# Patient Record
Sex: MALE | Race: WHITE | NOT HISPANIC OR LATINO | Employment: UNEMPLOYED | URBAN - METROPOLITAN AREA
[De-identification: names, ages, dates, MRNs, and addresses within clinical notes are randomized per-mention and may not be internally consistent; named-entity substitution may affect disease eponyms.]

---

## 2022-06-14 ENCOUNTER — HOSPITAL ENCOUNTER (EMERGENCY)
Facility: HOSPITAL | Age: 47
Discharge: HOME | End: 2022-06-14
Attending: FAMILY MEDICINE
Payer: MEDICAID

## 2022-06-14 VITALS
SYSTOLIC BLOOD PRESSURE: 131 MMHG | BODY MASS INDEX: 25.9 KG/M2 | DIASTOLIC BLOOD PRESSURE: 81 MMHG | OXYGEN SATURATION: 98 % | TEMPERATURE: 98 F | RESPIRATION RATE: 18 BRPM | WEIGHT: 185 LBS | HEIGHT: 71 IN | HEART RATE: 78 BPM

## 2022-06-14 DIAGNOSIS — T15.91XA FOREIGN BODY, EYE, RIGHT, INITIAL ENCOUNTER: Primary | ICD-10-CM

## 2022-06-14 PROCEDURE — 65210 REMOVE FOREIGN BODY FROM EYE: CPT | Mod: RT

## 2022-06-14 PROCEDURE — 25000003 PHARM REV CODE 250: Performed by: FAMILY MEDICINE

## 2022-06-14 PROCEDURE — 99284 EMERGENCY DEPT VISIT MOD MDM: CPT | Mod: 25

## 2022-06-14 RX ORDER — GENTAMICIN SULFATE 3 MG/ML
2 SOLUTION/ DROPS OPHTHALMIC 4 TIMES DAILY
Qty: 5 ML | Refills: 0 | Status: SHIPPED | OUTPATIENT
Start: 2022-06-14 | End: 2022-06-14 | Stop reason: SDUPTHER

## 2022-06-14 RX ORDER — GENTAMICIN SULFATE 3 MG/ML
2 SOLUTION/ DROPS OPHTHALMIC 4 TIMES DAILY
Qty: 5 ML | Refills: 0 | Status: SHIPPED | OUTPATIENT
Start: 2022-06-14 | End: 2022-06-23

## 2022-06-14 RX ORDER — KETOROLAC TROMETHAMINE 5 MG/ML
1 SOLUTION OPHTHALMIC EVERY 6 HOURS
Qty: 10 ML | Refills: 0 | Status: SHIPPED | OUTPATIENT
Start: 2022-06-14 | End: 2022-06-21

## 2022-06-14 RX ORDER — TETRACAINE HYDROCHLORIDE 5 MG/ML
2 SOLUTION OPHTHALMIC
Status: COMPLETED | OUTPATIENT
Start: 2022-06-14 | End: 2022-06-14

## 2022-06-14 RX ORDER — GENTAMICIN SULFATE 3 MG/ML
2 SOLUTION/ DROPS OPHTHALMIC 4 TIMES DAILY
Qty: 15 ML | Refills: 0 | Status: SHIPPED | OUTPATIENT
Start: 2022-06-14 | End: 2022-06-14 | Stop reason: SDUPTHER

## 2022-06-14 RX ADMIN — TETRACAINE HYDROCHLORIDE 2 DROP: 5 SOLUTION OPHTHALMIC at 07:06

## 2022-06-14 NOTE — Clinical Note
"Olegario Bandamarc" Herminio was seen and treated in our emergency department on 6/14/2022.  He may return to work on 06/16/2022.       If you have any questions or concerns, please don't hesitate to call.      Eboni Manley MD"

## 2022-06-14 NOTE — DISCHARGE INSTRUCTIONS
Rest  Increase fluid intake  Follow up with ophthalmologist in 1-2 days if condition doesn't improve

## 2022-06-14 NOTE — ED PROVIDER NOTES
Encounter Date: 6/14/2022       History     Chief Complaint   Patient presents with    Eye Injury     Pt states he may have gotten wood in his eye while cutting wood yesterday.  He had glasses on, but felt something go in his eye, no pain or discomfort at the time.   Woke up this am and eye was red and painful.       This is a 48 yo male that comes in with a foreign body in the right eye.  He states that he was working with wood yesterday and he was wearing eye protection but somehow a tiny splinter that it is his right eye.  States that it did not bother him until he got home last night and then it was irritating and this morning his eyes swollen    The history is provided by the patient.   Eye Injury  This is a new problem. The current episode started yesterday. The problem occurs constantly. The problem has not changed since onset.He has tried water for the symptoms. The treatment provided no relief.     Review of patient's allergies indicates:  No Known Allergies  History reviewed. No pertinent past medical history.  History reviewed. No pertinent surgical history.  History reviewed. No pertinent family history.  Social History     Tobacco Use    Smoking status: Current Every Day Smoker     Packs/day: 0.50    Smokeless tobacco: Never Used   Substance Use Topics    Alcohol use: Not Currently    Drug use: Never     Review of Systems   Constitutional: Negative.    HENT: Negative.    Eyes:        Tiny splinter in the right eye   Respiratory: Negative.    Cardiovascular: Negative.    Endocrine: Negative.    Neurological: Negative.    Psychiatric/Behavioral: Negative.    All other systems reviewed and are negative.      Physical Exam     Initial Vitals   BP Pulse Resp Temp SpO2   06/14/22 0759 06/14/22 0717 06/14/22 0717 06/14/22 0717 06/14/22 0717   131/81 78 18 97.8 °F (36.6 °C) 98 %      MAP       --                Physical Exam    Nursing note and vitals reviewed.  Constitutional: He appears well-developed  and well-nourished.   HENT:   Head: Normocephalic.   Eyes: EOM and lids are normal. Pupils are equal, round, and reactive to light. Foreign body present in the right eye. Right conjunctiva is injected.       Tiny splinter located in the right eye adjacent to the pupil medially.    Neck:   Normal range of motion.  Cardiovascular: Normal rate and regular rhythm.   Pulmonary/Chest: Breath sounds normal.   Abdominal: Abdomen is soft. Bowel sounds are normal.   Musculoskeletal:         General: Normal range of motion.      Cervical back: Normal range of motion.     Neurological: He is alert and oriented to person, place, and time.   Skin: Skin is warm and dry.   Psychiatric: He has a normal mood and affect.         ED Course   Foreign Body    Date/Time: 6/14/2022 7:59 AM  Performed by: Eboni Manley MD  Authorized by: Eboni Manley MD   Consent Done: Emergent Situation  Body area: eye    Anesthesia:  Anesthetic total: 0 mL    Patient sedated: no  Localization method: eyelid eversion, magnification and visualized  Removal mechanism: 27-gauge needle, irrigation and moist cotton swab  Eye not examined with fluorescein.  No residual rust ring present.  Depth: embedded  Complexity: simple  1 objects recovered.  Objects recovered: wood splinter  Post-procedure assessment: foreign body removed  Patient tolerance: Patient tolerated the procedure well with no immediate complications      Labs Reviewed - No data to display       Imaging Results    None          Medications   TETRAcaine HCl (PF) 0.5 % Drop 2 drop (2 drops Right Eye Given by Provider 6/14/22 8259)     Medical Decision Making:   Differential Diagnosis:   Foreign body- wood splinter in right eye  ED Management:  Splinter removed                      Clinical Impression:   Final diagnoses:  [T15.91XA] Foreign body, eye, right, initial encounter (Primary)          ED Disposition Condition    Discharge Stable        ED Prescriptions     Medication  Sig Dispense Start Date End Date Auth. Provider    gentamicin (GARAMYCIN) 0.3 % ophthalmic solution  (Status: Discontinued) Place 2 drops into the right eye 4 (four) times daily. 15 mL 6/14/2022 6/14/2022 Eboni Manley MD    gentamicin (GARAMYCIN) 0.3 % ophthalmic solution  (Status: Discontinued) Place 2 drops into the right eye 4 (four) times daily. for 9 days 5 mL 6/14/2022 6/14/2022 Eboni Manley MD    gentamicin (GARAMYCIN) 0.3 % ophthalmic solution  (Status: Discontinued) Place 2 drops into the right eye 4 (four) times daily. for 9 days 5 mL 6/14/2022 6/14/2022 Eboni Manley MD    gentamicin (GARAMYCIN) 0.3 % ophthalmic solution Place 2 drops into the right eye 4 (four) times daily. for 9 days 5 mL 6/14/2022 6/23/2022 Eboni Manley MD        Follow-up Information     Follow up With Specialties Details Why Contact Info    Ophthalmologist    in 1-2 days if condition worsens           Eboni Manley MD  06/14/22 0804       Eboni Manley MD  06/14/22 0811

## 2022-09-09 ENCOUNTER — HOSPITAL ENCOUNTER (EMERGENCY)
Facility: HOSPITAL | Age: 47
Discharge: HOME | End: 2022-09-09
Attending: FAMILY MEDICINE
Payer: MEDICAID

## 2022-09-09 ENCOUNTER — HOSPITAL ENCOUNTER (EMERGENCY)
Facility: HOSPITAL | Age: 47
Discharge: OTHER INSTITUTION | End: 2022-09-09
Attending: EMERGENCY MEDICINE
Payer: MEDICAID

## 2022-09-09 VITALS
DIASTOLIC BLOOD PRESSURE: 90 MMHG | BODY MASS INDEX: 27.47 KG/M2 | RESPIRATION RATE: 18 BRPM | TEMPERATURE: 98 F | HEART RATE: 80 BPM | SYSTOLIC BLOOD PRESSURE: 145 MMHG | OXYGEN SATURATION: 98 % | WEIGHT: 196.19 LBS | HEIGHT: 71 IN

## 2022-09-09 VITALS
OXYGEN SATURATION: 98 % | BODY MASS INDEX: 25.9 KG/M2 | RESPIRATION RATE: 18 BRPM | SYSTOLIC BLOOD PRESSURE: 139 MMHG | HEART RATE: 85 BPM | HEIGHT: 71 IN | DIASTOLIC BLOOD PRESSURE: 82 MMHG | WEIGHT: 185 LBS | TEMPERATURE: 98 F

## 2022-09-09 DIAGNOSIS — T15.90XD FOREIGN BODY IN EYE, UNSPECIFIED LATERALITY, SUBSEQUENT ENCOUNTER: Primary | ICD-10-CM

## 2022-09-09 DIAGNOSIS — T15.91XA EYE FOREIGN BODY, RIGHT, INITIAL ENCOUNTER: Primary | ICD-10-CM

## 2022-09-09 PROCEDURE — 25000003 PHARM REV CODE 250: Performed by: EMERGENCY MEDICINE

## 2022-09-09 PROCEDURE — 90471 IMMUNIZATION ADMIN: CPT | Performed by: PHYSICIAN ASSISTANT

## 2022-09-09 PROCEDURE — 25000003 PHARM REV CODE 250: Performed by: PHYSICIAN ASSISTANT

## 2022-09-09 PROCEDURE — 99284 EMERGENCY DEPT VISIT MOD MDM: CPT | Mod: 25,27

## 2022-09-09 PROCEDURE — 25000003 PHARM REV CODE 250: Performed by: STUDENT IN AN ORGANIZED HEALTH CARE EDUCATION/TRAINING PROGRAM

## 2022-09-09 PROCEDURE — 99285 EMERGENCY DEPT VISIT HI MDM: CPT

## 2022-09-09 PROCEDURE — 63600175 PHARM REV CODE 636 W HCPCS: Performed by: PHYSICIAN ASSISTANT

## 2022-09-09 PROCEDURE — 90715 TDAP VACCINE 7 YRS/> IM: CPT | Performed by: PHYSICIAN ASSISTANT

## 2022-09-09 RX ORDER — ERYTHROMYCIN 5 MG/G
OINTMENT OPHTHALMIC EVERY 6 HOURS
Qty: 3.5 G | Refills: 0 | Status: SHIPPED | OUTPATIENT
Start: 2022-09-09 | End: 2022-09-14

## 2022-09-09 RX ORDER — TETRACAINE HYDROCHLORIDE 5 MG/ML
2 SOLUTION OPHTHALMIC
Status: DISCONTINUED | OUTPATIENT
Start: 2022-09-09 | End: 2022-09-09 | Stop reason: HOSPADM

## 2022-09-09 RX ORDER — ERYTHROMYCIN 5 MG/G
OINTMENT OPHTHALMIC
Status: COMPLETED | OUTPATIENT
Start: 2022-09-09 | End: 2022-09-09

## 2022-09-09 RX ORDER — TETRACAINE HYDROCHLORIDE 5 MG/ML
2 SOLUTION OPHTHALMIC
Status: COMPLETED | OUTPATIENT
Start: 2022-09-09 | End: 2022-09-09

## 2022-09-09 RX ADMIN — TETRACAINE HYDROCHLORIDE 2 DROP: 5 SOLUTION OPHTHALMIC at 01:09

## 2022-09-09 RX ADMIN — TETANUS TOXOID, REDUCED DIPHTHERIA TOXOID AND ACELLULAR PERTUSSIS VACCINE, ADSORBED 0.5 ML: 5; 2.5; 8; 8; 2.5 SUSPENSION INTRAMUSCULAR at 04:09

## 2022-09-09 RX ADMIN — FLUORESCEIN SODIUM 1 EACH: 1 STRIP OPHTHALMIC at 01:09

## 2022-09-09 RX ADMIN — ERYTHROMYCIN 1 INCH: 5 OINTMENT OPHTHALMIC at 05:09

## 2022-09-09 NOTE — ED PROVIDER NOTES
Encounter Date: 9/9/2022       History     Chief Complaint   Patient presents with    Eye Problem     Believes he has a piece of metal in his R eye from grinding about a week ago     20/40 L   20/40 R       20/40 both eyes     Patient presents the emergency department after metal grinding approximately 4 5 days ago.  He states he can see something in his eye responsive comes emergency department.  No other injuries.  He does have intermittent blurred vision he states.    Review of patient's allergies indicates:  No Known Allergies  No past medical history on file.  No past surgical history on file.  No family history on file.  Social History     Tobacco Use    Smoking status: Every Day     Packs/day: 0.50     Types: Cigarettes    Smokeless tobacco: Never   Substance Use Topics    Alcohol use: Not Currently    Drug use: Never     Review of Systems   Constitutional:  Negative for fever.   HENT:  Negative for sore throat.    Eyes:  Positive for pain and redness.   Respiratory:  Negative for shortness of breath.    Cardiovascular:  Negative for chest pain.   Gastrointestinal:  Negative for nausea.   Genitourinary:  Negative for dysuria.   Musculoskeletal:  Negative for back pain.   Skin:  Negative for rash.   Neurological:  Negative for weakness.   Hematological:  Does not bruise/bleed easily.     Physical Exam     Initial Vitals [09/09/22 1120]   BP Pulse Resp Temp SpO2   (!) 148/84 87 16 98.1 °F (36.7 °C) 97 %      MAP       --         Physical Exam    Constitutional: He appears well-developed and well-nourished.   HENT:   Head: Normocephalic and atraumatic.   Eyes: EOM are normal.   Mild scleral injection of right eye with no hyphema or hypopyon.  Small black dot possible mm is almost directly in the middle of the patient's cornea over pupil.   Neck:   Normal range of motion.  Cardiovascular:  Normal rate and regular rhythm.           Pulmonary/Chest: Breath sounds normal. No respiratory distress. He has no wheezes.  He has no rales.   Abdominal: Abdomen is soft. Bowel sounds are normal. He exhibits no distension. There is no abdominal tenderness. There is no rebound.   Musculoskeletal:         General: Normal range of motion.      Cervical back: Normal range of motion.     Neurological: He is alert and oriented to person, place, and time.       ED Course   Procedures  Labs Reviewed - No data to display       Imaging Results    None          Medications   TETRAcaine HCl (PF) 0.5 % Drop 2 drop (2 drops Right Eye Given by Provider 9/9/22 1300)   fluorescein ophthalmic strip 1 each (1 each Left Eye Given by Provider 9/9/22 1315)     Medical Decision Making:   Initial Assessment:   Patient appears to have a small black foreign body imbedded in the cornea directly over the pupil.  As this is over the patient's visual fields not attempt to remove myself physician be managed by an ophthalmologist to the risk of scarring in the visual field.  Discussed case with transfer center patient will be seen by Ophthalmology at [facility].    Discussed with Dr. Reyes at [facility] who accepts the patient for Ophthalmology consultation.    Patient will drive himself to the emergency department as he has been driving since this incident and feels safe at this point                    Clinical Impression:   Final diagnoses:  [T15.91XA] Eye foreign body, right, initial encounter (Primary)      ED Disposition Condition    Transfer to Another Facility Stable                Jared Jean MD  09/09/22 6560

## 2022-09-09 NOTE — DISCHARGE INSTRUCTIONS
Please reports the emergency department at [facility].  Ophthalmology will be there to have foreign body removed.

## 2022-09-09 NOTE — CONSULTS
Consultation Report  Ophthalmology Service    Date: 09/09/2022    Chief complaint/Reason for Consult: FB right cornea     History of Present Illness: Olegario Durham is a 47 y.o. male with no significant POcularHx who presents with metallic FB to right eye after shaving metal about 1 week ago. Patient initially thought he had flash burn, so waited to be seen by doctor. Insurance denied by outside optometrists today, so went to [facility] ED before being transferred for further care and management    Patient denies any visual changes, visual disturbances, such as flashes, floaters, or curtain-veil in visual field, and ocular discomfort OU.    POcularHx: Denies history of ocular problems or past ocular surgeries.    Current eye gtts: Denies     Family Hx: Denies family history of glaucoma, macular degeneration, or blindness. family history is not on file.     PMHx:  has no past medical history on file.     PSurgHx:  has no past surgical history on file.     Home Medications:   Prior to Admission medications    Not on File        Medications this encounter:    fluorescein  1 strip Left Eye ED 1 Time    fluorescein  1 strip Right Eye ED 1 Time    TETRAcaine HCl (PF)  2 drop Both Eyes ED 1 Time       Allergies: has No Known Allergies.     Social:  reports that he has been smoking. He has been smoking an average of .5 packs per day. He has never used smokeless tobacco. He reports that he does not currently use alcohol. He reports that he does not use drugs.     ROS: As per HPI    Ocular examination/Dilated fundus examination:  Base Eye Exam       Visual Acuity (Snellen - Linear)         Right Left    Dist sc 20/40 20/25              Tonometry (Tonopen, 4:52 PM)         Right Left    Pressure 15 16              Pupils         Pupils    Right PERRL    Left PERRL              Extraocular Movement         Right Left     Full, Ortho Full, Ortho                  Slit Lamp and Fundus Exam       External Exam         Right Left     External Normal Normal              Slit Lamp Exam         Right Left    Lids/Lashes Normal Normal    Conjunctiva/Sclera White and quiet White and quiet    Cornea central metallic FB, no infiltrate Clear    Anterior Chamber Deep and quiet Deep and quiet    Iris Round and reactive Round and reactive    Lens Clear Clear    Anterior Vitreous Normal Normal              Fundus Exam         Right Left    Disc Pink and sharp Pink and sharp    C/D Ratio 0.3 0.3    Macula Flat Flat    Vessels Normal Normal    Periphery Flat 360, no holes/tears/detachments Flat 360, no holes/tears/detachments                      Assessment/Plan:     1. Metallic Corneal Foreign Body, OD  - central FB found at OSH ED, transferred for ophtho to remove.  - ocular discomfort relieved with topical anesthetic.  - removed at slit lamp with TB needle without complications. No infiltrate underneath epi defect of metallic FB.  - 1 week of symptoms, initially deferred seeing specialist because patient suspected it was flash burn as opposed to FB.  - Will have patient take Erythromycin Ointment TID OD until clinic on Monday.  - Return precautions discussed, if redness/pain/vision worsens acutely, return to ED for reevaluation.    RTC Monday AM at Ophtho clinic, discussed with patient location of clinic. Please confirm with patient he remembers location for 8am.  - K check  - OCT Enmanuel Soto MD PGY-3  [employer] Ophthalmology Resident  09/09/2022  4:53 PM

## 2022-09-09 NOTE — ED PROVIDER NOTES
Encounter Date: 9/9/2022       History     Chief Complaint   Patient presents with    Eye Injury     Transfer from [facility] for  opthomology , states has fb in rt eye  for about one week      Patient presents to the ER for an Opthalmology consult after being transferred from a local hospital due to a reported metal foreign body in his eye x5 days; case was dicussed with Dr Soto of Opthalmology prior to transfer    The history is provided by the patient.   Eye Injury  This is a new problem. The current episode started more than 2 days ago. The problem occurs constantly. The problem has not changed since onset.Pertinent negatives include no chest pain, no abdominal pain, no headaches and no shortness of breath.   Review of patient's allergies indicates:  No Known Allergies  No past medical history on file.  No past surgical history on file.  No family history on file.  Social History     Tobacco Use    Smoking status: Every Day     Packs/day: 0.50     Types: Cigarettes    Smokeless tobacco: Never   Substance Use Topics    Alcohol use: Not Currently    Drug use: Never     Review of Systems   Constitutional:  Negative for fever.   HENT:  Negative for sore throat.    Eyes:  Positive for pain and redness.   Respiratory:  Negative for shortness of breath.    Cardiovascular:  Negative for chest pain.   Gastrointestinal:  Negative for abdominal pain and nausea.   Genitourinary:  Negative for dysuria.   Musculoskeletal:  Negative for back pain.   Skin:  Negative for rash.   Neurological:  Negative for weakness and headaches.   Hematological:  Does not bruise/bleed easily.   Psychiatric/Behavioral: Negative.       Physical Exam     Initial Vitals [09/09/22 1527]   BP Pulse Resp Temp SpO2   (!) 169/95 84 18 98.1 °F (36.7 °C) 98 %      MAP       --         Physical Exam    Vitals reviewed.  Constitutional: He appears well-developed.   HENT:   Head: Normocephalic and atraumatic.   Eyes: Conjunctivae and EOM are normal. Pupils are  equal, round, and reactive to light. Foreign body present in the right eye.       Small punctuate suspected metal foreign body   Neck:   Normal range of motion.  Cardiovascular:  Normal rate, regular rhythm and normal heart sounds.           Pulmonary/Chest: Breath sounds normal. He exhibits no tenderness.   Abdominal: Abdomen is soft. Bowel sounds are normal. He exhibits no distension. There is no abdominal tenderness.   Musculoskeletal:         General: Normal range of motion.      Cervical back: Normal range of motion.     Neurological: He is alert and oriented to person, place, and time. He displays normal reflexes. No cranial nerve deficit or sensory deficit. GCS score is 15. GCS eye subscore is 4. GCS verbal subscore is 5. GCS motor subscore is 6.   Skin: Skin is warm. No pallor.   Psychiatric: He has a normal mood and affect. His behavior is normal. Judgment and thought content normal.       ED Course   Procedures  Labs Reviewed - No data to display       Imaging Results    None          Medications   TETRAcaine HCl (PF) 0.5 % Drop 2 drop (has no administration in time range)   fluorescein ophthalmic strip 1 each (has no administration in time range)   fluorescein ophthalmic strip 1 each (has no administration in time range)   Tdap (BOOSTRIX) vaccine injection 0.5 mL (0.5 mLs Intramuscular Given 9/9/22 1622)   erythromycin 5 mg/gram (0.5 %) ophthalmic ointment (1 inch Right Eye Given 9/9/22 1719)                 ED Course as of 09/09/22 1734   Fri Sep 09, 2022   1733 Dr Soto removed foreign body and recommends follow up 9/12 in clinic and discharge on erythromycin ointment  [AL]      ED Course User Index  [AL] SARAH Valdes             Clinical Impression:   Final diagnoses:  [T15.90XD] Foreign body in eye, unspecified laterality, subsequent encounter (Primary)      ED Disposition Condition    Discharge Stable          ED Prescriptions       Medication Sig Dispense Start Date End Date Auth. Provider     erythromycin (ROMYCIN) ophthalmic ointment Place into the right eye every 6 (six) hours. Place a 1/2 inch ribbon of ointment into the lower eyelid. for 5 days 3.5 g 9/9/2022 9/14/2022 SARAH Valdes          Follow-up Information       Follow up With Specialties Details Why Contact Info    discharge followup    If your symptoms become WORSE or you DO NOT IMPROVE and you are unable to reach your health care provider, you should RETURN to the emergency department    discharge info    Discussed all pertinent ED information, results, diagnosis and treatment plan; All questions and concerns were addressed at this time. Patient voices understanding of information and instructions. Patient is comfortable with plan and discharge    Opthomology Followup  Go to   you have an appointment tomamy at the Opthomology Clinic at [address] [place] [address], 9/12 per SARAH Matt  09/09/22 7991

## 2023-04-10 ENCOUNTER — HOSPITAL ENCOUNTER (EMERGENCY)
Facility: HOSPITAL | Age: 48
Discharge: HOME | End: 2023-04-10
Attending: EMERGENCY MEDICINE
Payer: MEDICAID

## 2023-04-10 VITALS
HEIGHT: 71 IN | SYSTOLIC BLOOD PRESSURE: 129 MMHG | RESPIRATION RATE: 25 BRPM | DIASTOLIC BLOOD PRESSURE: 87 MMHG | WEIGHT: 206 LBS | BODY MASS INDEX: 28.84 KG/M2 | TEMPERATURE: 98 F | HEART RATE: 82 BPM | OXYGEN SATURATION: 98 %

## 2023-04-10 DIAGNOSIS — R00.2 PALPITATIONS: ICD-10-CM

## 2023-04-10 DIAGNOSIS — I49.1 PREMATURE ATRIAL CONTRACTIONS: Primary | ICD-10-CM

## 2023-04-10 LAB
ALBUMIN SERPL-MCNC: 4.1 G/DL (ref 3.5–5)
ALBUMIN/GLOB SERPL: 1.4 RATIO (ref 1.1–2)
ALP SERPL-CCNC: 83 UNIT/L (ref 40–150)
ALT SERPL-CCNC: 24 UNIT/L (ref 0–55)
AST SERPL-CCNC: 15 UNIT/L (ref 5–34)
BASOPHILS # BLD AUTO: 0.04 X10(3)/MCL (ref 0–0.2)
BASOPHILS NFR BLD AUTO: 0.6 %
BILIRUBIN DIRECT+TOT PNL SERPL-MCNC: 0.3 MG/DL
BUN SERPL-MCNC: 16 MG/DL (ref 8.9–20.6)
CALCIUM SERPL-MCNC: 9.5 MG/DL (ref 8.4–10.2)
CHLORIDE SERPL-SCNC: 110 MMOL/L (ref 98–107)
CO2 SERPL-SCNC: 21 MMOL/L (ref 22–29)
CREAT SERPL-MCNC: 0.97 MG/DL (ref 0.73–1.18)
EOSINOPHIL # BLD AUTO: 0.11 X10(3)/MCL (ref 0–0.9)
EOSINOPHIL NFR BLD AUTO: 1.6 %
ERYTHROCYTE [DISTWIDTH] IN BLOOD BY AUTOMATED COUNT: 12 % (ref 11.5–17)
GFR SERPLBLD CREATININE-BSD FMLA CKD-EPI: >60 MLS/MIN/1.73/M2
GLOBULIN SER-MCNC: 3 GM/DL (ref 2.4–3.5)
GLUCOSE SERPL-MCNC: 81 MG/DL (ref 74–100)
HCT VFR BLD AUTO: 43.9 % (ref 42–52)
HGB BLD-MCNC: 14.5 G/DL (ref 14–18)
IMM GRANULOCYTES # BLD AUTO: 0.01 X10(3)/MCL (ref 0–0.04)
IMM GRANULOCYTES NFR BLD AUTO: 0.1 %
LYMPHOCYTES # BLD AUTO: 1.92 X10(3)/MCL (ref 0.6–4.6)
LYMPHOCYTES NFR BLD AUTO: 27.2 %
MAGNESIUM SERPL-MCNC: 2.3 MG/DL (ref 1.6–2.6)
MCH RBC QN AUTO: 31.5 PG (ref 27–31)
MCHC RBC AUTO-ENTMCNC: 33 G/DL (ref 33–36)
MCV RBC AUTO: 95.4 FL (ref 80–94)
MONOCYTES # BLD AUTO: 0.57 X10(3)/MCL (ref 0.1–1.3)
MONOCYTES NFR BLD AUTO: 8.1 %
NEUTROPHILS # BLD AUTO: 4.42 X10(3)/MCL (ref 2.1–9.2)
NEUTROPHILS NFR BLD AUTO: 62.4 %
PLATELET # BLD AUTO: 206 X10(3)/MCL (ref 130–400)
PMV BLD AUTO: 11.6 FL (ref 7.4–10.4)
POTASSIUM SERPL-SCNC: 4.1 MMOL/L (ref 3.5–5.1)
PROT SERPL-MCNC: 7.1 GM/DL (ref 6.4–8.3)
RBC # BLD AUTO: 4.6 X10(6)/MCL (ref 4.7–6.1)
SODIUM SERPL-SCNC: 141 MMOL/L (ref 136–145)
TROPONIN I SERPL-MCNC: <0.01 NG/ML (ref 0–0.04)
TSH SERPL-ACNC: 3.21 UIU/ML (ref 0.35–4.94)
WBC # SPEC AUTO: 7.1 X10(3)/MCL (ref 4.5–11.5)

## 2023-04-10 PROCEDURE — 84443 ASSAY THYROID STIM HORMONE: CPT | Performed by: EMERGENCY MEDICINE

## 2023-04-10 PROCEDURE — 93005 ELECTROCARDIOGRAM TRACING: CPT

## 2023-04-10 PROCEDURE — 93010 ELECTROCARDIOGRAM REPORT: CPT | Mod: ,,, | Performed by: INTERNAL MEDICINE

## 2023-04-10 PROCEDURE — 96360 HYDRATION IV INFUSION INIT: CPT

## 2023-04-10 PROCEDURE — 93010 EKG 12-LEAD: ICD-10-PCS | Mod: ,,, | Performed by: INTERNAL MEDICINE

## 2023-04-10 PROCEDURE — 84484 ASSAY OF TROPONIN QUANT: CPT | Performed by: EMERGENCY MEDICINE

## 2023-04-10 PROCEDURE — 85025 COMPLETE CBC W/AUTO DIFF WBC: CPT | Performed by: EMERGENCY MEDICINE

## 2023-04-10 PROCEDURE — 83735 ASSAY OF MAGNESIUM: CPT | Performed by: EMERGENCY MEDICINE

## 2023-04-10 PROCEDURE — 99285 EMERGENCY DEPT VISIT HI MDM: CPT | Mod: 25

## 2023-04-10 PROCEDURE — 63600175 PHARM REV CODE 636 W HCPCS: Performed by: EMERGENCY MEDICINE

## 2023-04-10 PROCEDURE — 80053 COMPREHEN METABOLIC PANEL: CPT | Performed by: EMERGENCY MEDICINE

## 2023-04-10 RX ADMIN — SODIUM CHLORIDE, POTASSIUM CHLORIDE, SODIUM LACTATE AND CALCIUM CHLORIDE 1000 ML: 600; 310; 30; 20 INJECTION, SOLUTION INTRAVENOUS at 02:04

## 2023-04-10 NOTE — ED PROVIDER NOTES
"Encounter Date: 4/10/2023       History     Chief Complaint   Patient presents with    Palpitations     Pt states that about an hour ago it started feeling like his heart was beating funny. States that "it felt like my heart would beat 3 times real fast then pause" States that this has happened before but usually would go away on its own.     The history is provided by the patient and the spouse. No  was used.   Palpitations   This is a recurrent problem. The current episode started 1 to 2 hours ago. The problem occurs intermittently. The problem has been gradually improving. The problem is associated with an unknown factor. On average, each episode lasts 1 hour. Associated symptoms include irregular heartbeat. Pertinent negatives include no fever, no chest pain, no chest pressure, no exertional chest pressure, no orthopnea, no syncope, no nausea, no back pain, no dizziness, no weakness, no cough and no shortness of breath. He has tried nothing for the symptoms. Risk factors include family history.   Denies caffeine/stimulant use.  No EtOH.    Review of patient's allergies indicates:  No Known Allergies  No past medical history on file. none  No past surgical history on file. none  No family history on file.  Social History     Tobacco Use    Smoking status: Every Day     Packs/day: 0.50     Types: Cigarettes    Smokeless tobacco: Never   Substance Use Topics    Alcohol use: Not Currently    Drug use: Never     Review of Systems   Constitutional:  Negative for fever.   HENT:  Negative for sore throat.    Respiratory:  Negative for cough and shortness of breath.    Cardiovascular:  Positive for palpitations. Negative for chest pain, orthopnea and syncope.   Gastrointestinal:  Negative for nausea.   Genitourinary:  Negative for dysuria.   Musculoskeletal:  Negative for back pain.   Skin:  Negative for rash.   Neurological:  Negative for dizziness and weakness.   Hematological:  Does not " bruise/bleed easily.     Physical Exam     Initial Vitals [04/10/23 0056]   BP Pulse Resp Temp SpO2   138/82 96 18 97.8 °F (36.6 °C) 100 %      MAP       --         Physical Exam    Nursing note and vitals reviewed.  Constitutional: He appears well-developed and well-nourished.   HENT:   Head: Normocephalic and atraumatic.   Right Ear: External ear normal.   Left Ear: External ear normal.   Nose: Nose normal.   Eyes: Conjunctivae and EOM are normal. Pupils are equal, round, and reactive to light.   Neck: Neck supple.   Normal range of motion.  Cardiovascular:  Normal rate, regular rhythm, normal heart sounds and intact distal pulses.           Pulmonary/Chest: Breath sounds normal.   Abdominal: Abdomen is soft. Bowel sounds are normal.   Musculoskeletal:         General: Normal range of motion.      Cervical back: Normal range of motion and neck supple.     Neurological: He is alert and oriented to person, place, and time. He has normal strength. GCS score is 15. GCS eye subscore is 4. GCS verbal subscore is 5. GCS motor subscore is 6.   Skin: Skin is warm and dry. Capillary refill takes less than 2 seconds.   Psychiatric: His behavior is normal. Judgment and thought content normal. His mood appears anxious.       ED Course   Procedures  Labs Reviewed   COMPREHENSIVE METABOLIC PANEL - Abnormal; Notable for the following components:       Result Value    Chloride 110 (*)     Carbon Dioxide 21 (*)     All other components within normal limits   CBC WITH DIFFERENTIAL - Abnormal; Notable for the following components:    RBC 4.60 (*)     MCV 95.4 (*)     MCH 31.5 (*)     MPV 11.6 (*)     All other components within normal limits   MAGNESIUM - Normal   TROPONIN I - Normal   TSH - Normal   CBC W/ AUTO DIFFERENTIAL    Narrative:     The following orders were created for panel order CBC auto differential.  Procedure                               Abnormality         Status                     ---------                                -----------         ------                     CBC with Differential[[id number]]        Abnormal            Final result                 Please view results for these tests on the individual orders.     EKG Readings: (Independently Interpreted)   Initial Reading: No STEMI. Rhythm: Normal Sinus Rhythm. Heart Rate: 84. Ectopy: Rare PACs. Conduction: Normal. ST Segments: Normal ST Segments. T Waves: Normal. Axis: Normal. Clinical Impression: Normal Sinus Rhythm with PACs     Imaging Results              X-Ray Chest AP Portable (Preliminary result)  Result time 04/10/23 02:21:55      Wet Read by Olegario Seals MD (04/10/23 02:21:55, [facility] - Emergency Dept, Emergency Medicine)    Normal chest                                     Medications   lactated ringers bolus 1,000 mL (1,000 mLs Intravenous New Bag 4/10/23 0202)      Differential includes:  anxiety, dysrhythmia, electrolyte disturbance, thyroid disease, stimulant use.  Will obtain EKG, CXR, CBC, CMP, Mag, troponin, TSH and bolus with IVF.                        Clinical Impression:   Final diagnoses:  [R00.2] Palpitations  [I49.1] Premature atrial contractions (Primary)        ED Disposition Condition    Discharge Stable          ED Prescriptions    None       Follow-up Information       Follow up With Specialties Details Why Contact Info    Jamel Vasques MD Cardiology Schedule an appointment as soon as possible for a visit in 2 weeks  [address] [facility] [id number] [phone]               Olegario Seals MD  04/10/23 0250

## 2023-04-14 ENCOUNTER — PATIENT OUTREACH (OUTPATIENT)
Dept: EMERGENCY MEDICINE | Facility: HOSPITAL | Age: 48
End: 2023-04-14
Payer: MEDICAID

## 2023-04-18 NOTE — PATIENT INSTRUCTIONS
Please give me a call if you need  anything in the future.  Thank you,  Marian, Nurse Navigator  [facility] [phone]     Why Should I Have My Own Doctor or Nurse Practitioner (PCP) to Take Care of Me  What is a PCP (Primary Care Provider)?    A primary care provider is a doctor or nurse practitioner who you can call for an appointment and will see you when you are sick.    You will also be seen at scheduled appointment times during the year to check on your diabetes, or high blood pressure, or heart disease.    Why see the same PCP (doctor/nurse practitioner)?    You can be seen faster when you are sick           You, the PCP (doctor/nurse practitioner) and the office staff get to know each other; you begin to trust them to care for you. You take part in your health choices.   All of you together are a team.    Your medicine is looked at every time you visit, to be sure you are taking the medicine, as the PCP (doctor/nurse practitioner) ordered.    Your PCP (doctor/nurse practitioner) and their staff help keep you healthy and out of the hospital.  They can catch sicknesses earlier by ordering tests once a year to stop or prevent the sickness from getting worse.      Your PCP (doctor/nurse practitioner) can send you to providers who specialize (heart/bone/lung) if you need.  They and their office staff help keep track of your seeing other providers (doctors/nurse practitioners) and tests (CT/ MRIs/ X Rays)) taken.        PCPs want you to stay healthy.  Let us care for you.         Why is taking care of your mouth/teeth/gums important?     Your mouth is the opening to your body.  If not kept clean, it can let in sickness to the rest of your body.     Oral Health Care (Dentists)                 City:  Provider Address Phone Number Insurance Plan   [place]:  None available                    Kirby Tubbs, [address] [place] [address] RADHAKirby MUNOZ [phone]   ADULTS ONLY Medicaid:  HB & AETNA  ONLY             [place]         [facility] ([facility]) [address] [phone]  DENTURES ONLY ADULT Medicaid:  HB & AETNA ONLY             [facility] [address] [facility] [phone] All Medicaid/Medicare             [place]         Dr. Cristhian Ring, DDS [address]  [facility] [address] [phone] Medicaid Children only 2 to 21             [facility] [place] [address] [facility] [phone] Accepts:   [facility], HB, Aetna         [facility] [address] [facility] [address], [place] [phone] Medicare             [facility] [address], [place] [phone] Medicaid Children only 2 to 21             [facility] [address], [place] [phone] Medicaid Children only 2 to 21             [facility]  [address], [place] [phone] Medicaid Children only 5 yrs and younger:  lip/tongue tie             [facility] [address] [facility] [phone] Medicaid Children only 2 to 21          [facility]  [address] [facility]  [phone]           [facility] [address] [facility] [phone] All Medicaid/Medicare :  ADULTS             [facility] [address] [facility] [phone] All Medicaid/Medicare :  ADULTS             [facility] [address] [facility] [phone] Medicaid Children only 2 to 21             [facility]  [address] [place] [phone] Medicaid Children only 2 to 21             Dr. Alexandra Basurto,  [facility] [phone] Medicare for Dentures Only             [facility] [place], [facility] [address] [place] [phone] All Medicaid/Medicare :   EXCEPT [facility]; ADULTS         Sebastien Ha [address] [facility] [phone]  "Accepts:   LHC, HB, Aetna             [address] [phone]  C, IHC, HB, Aetna/Medicare :  [facility]; [place]: [phone]  [facility]; [place]: [phone]       [facility]      [facility]  [address] [place] [place] [address]  Phone: [phone]  (Accepts all Medicaid for visit and glasses)     [facility]  [address]   [place] [address]  Phone: [phone]  (Only [facility] Medicaid for visit)  (All plans for glasses)     [facility]   [address] [place] [address]  Phone: [phone]  (All Medicaid plans for visit and glasses)  (Only 1 doctor that comes twice a month)        [facility]  [address] [place] [address]  Phone: [phone]  (No Medicaid for visit)  (Aetna, [facility], [facility] Healthy Blue for glasses)    [facility]  [address] [place] [address]  phone: [phone]  (Accepts all Medicaid)                 [facility]  [address] [address]  phone: [phone]  (Accepts [facility], [facility] and [facility] Medicaid)     [facility]  [address] [place], [place]; [address]  phone" [phone]  (Accepts all Medicaid)     .TT  "

## 2023-04-18 NOTE — PROGRESS NOTES
Spoke to pt for post ED visit and initial MCIP. Pt verbal consent to enroll in MCIP and continue f/u calls. Pt reports he is feeling better. Advised pt to follow discharge instructions. Discussed Heart Healthy Diet compliance, benefits of PCP, ED utilization and to utilize UCC for non emergency issues until establish PCP and when PCP is not available, oral/vision care, and to call [facility] as directed for appt from ED discharge instructions and stressed importance of obtaining pcp appt, also stressed importance to pt of being compliant with appts, health care and treatment plan and went to offer pcp options to pt, pt request and verbal consent to call his significant other Georgiana Ryder at [phone]. Offered pt to mail sdoh education and resource discussed. Pt agreed to mail this information to him. Verified pt address with pt. Pt voices understanding to instructions given and appreciation.   Attempt to reach out to pt significant other Georgiana Ryder at [phone], message reports not available and vm full, unable to reach.  Mail sdoh education/resource to pt per pt request.  Attempt to reach out to pt significant other Georgiana Ryder at [phone], message reports not available and vm full, unable to reach.  Mail sdoh education/resource to pt per pt request.  4/24/23 attempt to reach out to pt and significant other to discuss and try and obtain cardiology appt for f/u per ED discharge and PCP to establish care, unable to reach out to both.  Pradeepdeandre (significant other) returned call, discussed pt request to reach out to her to try and obtain cardiology appt and pcp, she reports that pt is on his way to [place] for work and she will call him and call back to discuss.  Grupo called back and request to obtain appt with [facility] and not with Dr. Jamel Vasques and [facility] in [place].   Called and obtained cardiology ED f/u appt with [facility] with Dr. Jared Reid for 5/3/23 at 2:10 pm.  Called Grupo per pt request and  verbal consent and notified of scheduled appt with [facility] with Dr. Jared Reid for 5/3/23 at 2:10 pm. Kealy request to call pt and notify of this appt to see if he would  be ok with this appt due to pt will be out of town in [place] for work.  Called pt to advised of appt with [facility] with Dr. Jared Reid for 5/3/23 at 2:10 pm. Pt request to change appt to the end of May.   Called [facility] and r/s appt per pt request to 5/30/23 at 3:20 pm with Dr. Jared Reid. Called pt and notified pt of r/s appt for 5/30/23 at 3:20 pm with Dr. Jared Reid. Stressed importance of attending appt. And pt reports that he will wait to obtain pcp appt until he come back Pt voices understanding to instructions given and appreciation.  4/25/23 spoke to pt for pcp appt, pt reports he will wait to obtain pcp appt until he return from working in [place].  4/25/23 Pt request and verbal consent to mail the appt letter for [facility] to [address] [place] [address], mailed per pt request.  Appointment:  [facility] 5/30/23 at 3:20 pm

## 2023-05-16 ENCOUNTER — PATIENT OUTREACH (OUTPATIENT)
Dept: EMERGENCY MEDICINE | Facility: HOSPITAL | Age: 48
End: 2023-05-16
Payer: MEDICAID

## 2023-05-24 NOTE — PROGRESS NOTES
Spoke to pt for f/u, doing well, no complaints. Pt reports he just got in town from work and is requesting to get a sooner appt with [facility] than appt scheduled on 5/30/23. Pt request assistance in doing so. Advised pt will call him in am to call on a conference call with [facility] to try and obtain a sooner appt. Unable to obtain sooner appt for [facility] .  5/24/23 called pt to discuss unable to obtain sooner appt, person answered # always called pt on, reports wrong # when asked for pt and then hung up.  5/24/23--pt return call and report that he said that I had the wrong # and he apologize, that he is having a bad day. advised pt just want to informed unapbe to obtain sooner appt and remind of upcoming cis appt 5/30/23 at 3:20pm , stressed importance of atttending appt. pt voices understanding to instructions given and appreciation.    Appointment:  [facility] 5/30/23 at 3:20 pm

## 2023-06-03 ENCOUNTER — HOSPITAL ENCOUNTER (EMERGENCY)
Facility: HOSPITAL | Age: 48
Discharge: HOME | End: 2023-06-03
Attending: EMERGENCY MEDICINE
Payer: MEDICAID

## 2023-06-03 VITALS
DIASTOLIC BLOOD PRESSURE: 81 MMHG | HEART RATE: 86 BPM | SYSTOLIC BLOOD PRESSURE: 124 MMHG | RESPIRATION RATE: 22 BRPM | OXYGEN SATURATION: 97 %

## 2023-06-03 DIAGNOSIS — R07.89 ATYPICAL CHEST PAIN: Primary | ICD-10-CM

## 2023-06-03 DIAGNOSIS — R07.9 CHEST PAIN: ICD-10-CM

## 2023-06-03 LAB
ALBUMIN SERPL-MCNC: 4.2 G/DL (ref 3.5–5)
ALBUMIN/GLOB SERPL: 1.2 RATIO (ref 1.1–2)
ALP SERPL-CCNC: 96 UNIT/L (ref 40–150)
ALT SERPL-CCNC: 39 UNIT/L (ref 0–55)
AST SERPL-CCNC: 30 UNIT/L (ref 5–34)
BASOPHILS # BLD AUTO: 0.05 X10(3)/MCL
BASOPHILS NFR BLD AUTO: 0.6 %
BILIRUBIN DIRECT+TOT PNL SERPL-MCNC: 0.6 MG/DL
BNP BLD-MCNC: <10 PG/ML
BUN SERPL-MCNC: 15 MG/DL (ref 8.9–20.6)
CALCIUM SERPL-MCNC: 9.4 MG/DL (ref 8.4–10.2)
CHLORIDE SERPL-SCNC: 104 MMOL/L (ref 98–107)
CO2 SERPL-SCNC: 24 MMOL/L (ref 22–29)
CREAT SERPL-MCNC: 1.1 MG/DL (ref 0.73–1.18)
D DIMER PPP IA.FEU-MCNC: 0.93 UG/ML FEU (ref 0–0.5)
EOSINOPHIL # BLD AUTO: 0.15 X10(3)/MCL (ref 0–0.9)
EOSINOPHIL NFR BLD AUTO: 1.7 %
ERYTHROCYTE [DISTWIDTH] IN BLOOD BY AUTOMATED COUNT: 12.1 % (ref 11.5–17)
GFR SERPLBLD CREATININE-BSD FMLA CKD-EPI: >60 MLS/MIN/1.73/M2
GLOBULIN SER-MCNC: 3.6 GM/DL (ref 2.4–3.5)
GLUCOSE SERPL-MCNC: 75 MG/DL (ref 74–100)
HCT VFR BLD AUTO: 46.1 % (ref 42–52)
HGB BLD-MCNC: 15.7 G/DL (ref 14–18)
IMM GRANULOCYTES # BLD AUTO: 0.04 X10(3)/MCL (ref 0–0.04)
IMM GRANULOCYTES NFR BLD AUTO: 0.5 %
LYMPHOCYTES # BLD AUTO: 2.46 X10(3)/MCL (ref 0.6–4.6)
LYMPHOCYTES NFR BLD AUTO: 27.8 %
MCH RBC QN AUTO: 31.4 PG (ref 27–31)
MCHC RBC AUTO-ENTMCNC: 34.1 G/DL (ref 33–36)
MCV RBC AUTO: 92.2 FL (ref 80–94)
MONOCYTES # BLD AUTO: 0.74 X10(3)/MCL (ref 0.1–1.3)
MONOCYTES NFR BLD AUTO: 8.4 %
NEUTROPHILS # BLD AUTO: 5.4 X10(3)/MCL (ref 2.1–9.2)
NEUTROPHILS NFR BLD AUTO: 61 %
PLATELET # BLD AUTO: 210 X10(3)/MCL (ref 130–400)
PMV BLD AUTO: 11.5 FL (ref 7.4–10.4)
POTASSIUM SERPL-SCNC: 4.4 MMOL/L (ref 3.5–5.1)
PROT SERPL-MCNC: 7.8 GM/DL (ref 6.4–8.3)
RBC # BLD AUTO: 5 X10(6)/MCL (ref 4.7–6.1)
SODIUM SERPL-SCNC: 138 MMOL/L (ref 136–145)
TROPONIN I SERPL-MCNC: <0.01 NG/ML (ref 0–0.04)
WBC # SPEC AUTO: 8.84 X10(3)/MCL (ref 4.5–11.5)

## 2023-06-03 PROCEDURE — 94761 N-INVAS EAR/PLS OXIMETRY MLT: CPT

## 2023-06-03 PROCEDURE — 85025 COMPLETE CBC W/AUTO DIFF WBC: CPT | Performed by: EMERGENCY MEDICINE

## 2023-06-03 PROCEDURE — 93005 ELECTROCARDIOGRAM TRACING: CPT

## 2023-06-03 PROCEDURE — 80053 COMPREHEN METABOLIC PANEL: CPT | Performed by: EMERGENCY MEDICINE

## 2023-06-03 PROCEDURE — 25500020 PHARM REV CODE 255: Performed by: EMERGENCY MEDICINE

## 2023-06-03 PROCEDURE — 83880 ASSAY OF NATRIURETIC PEPTIDE: CPT | Performed by: EMERGENCY MEDICINE

## 2023-06-03 PROCEDURE — 85379 FIBRIN DEGRADATION QUANT: CPT | Performed by: EMERGENCY MEDICINE

## 2023-06-03 PROCEDURE — 84484 ASSAY OF TROPONIN QUANT: CPT | Performed by: EMERGENCY MEDICINE

## 2023-06-03 PROCEDURE — 99285 EMERGENCY DEPT VISIT HI MDM: CPT | Mod: 25

## 2023-06-03 RX ORDER — ASPIRIN 325 MG
325 TABLET ORAL
Status: DISCONTINUED | OUTPATIENT
Start: 2023-06-03 | End: 2023-06-03 | Stop reason: HOSPADM

## 2023-06-03 RX ADMIN — IOPAMIDOL 100 ML: 755 INJECTION, SOLUTION INTRAVENOUS at 05:06

## 2023-06-03 NOTE — ED PROVIDER NOTES
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 States that the pain radiates to both arms and it "felt like someone hit him in the back of arm with a baseball bat and broke my arm" Also states that the chest pain he is experiencing has not gone away since the last time he was here which was 3-4 weeks ago.       HISTORY OF PRESENT ILLNESS:   Olegario Durham is a 48 y.o. male who presents with chief complaint Chest pain.  Onset was about a month ago whenever he began having intermittent episodes of sharp left-sided chest pain that he states would come on whenever he would get upset angry, and states aggravated by lying on his left side at night.  He also reports having some intermittent palpitations sometimes feeling like there is a gurgling in his chest.  He states that tonight he [place] and began having this sharp left-sided chest pain but this time began feeling little lightheaded and short of breath, and states now he notices the chest pain aggravated with taking a deep breath.  He does report a remote history of DVT for which he was initially on Coumadin but could never get his INR at goal so they switched him to Xarelto but he stopped taking it because he could not afford it.  He does still smoke every day.  He reports he followed up with Cardiology earlier this week and had a Holter monitor which he was supposed to return later today.  He reports significant family history of heart disease stating his mother had CABG in her 40s.    The history is provided by the patient.       REVIEW OF SYSTEMS: as noted in the HPI.  NURSING NOTES REVIEWED      PAST MEDICAL/SURGICAL HISTORY: History reviewed. No pertinent past medical history. History reviewed. No pertinent surgical history.    FAMILY HISTORY: History reviewed. No pertinent " family history.    SOCIAL HISTORY:   Social History     Tobacco Use    Smoking status: Every Day     Packs/day: 0.50     Types: Cigarettes    Smokeless tobacco: Never   Substance Use Topics    Alcohol use: Not Currently    Drug use: Never       ALLERGIES: Review of patient's allergies indicates:  No Known Allergies    PHYSICAL EXAM:  Initial Vitals [06/03/23 0407]   BP Pulse Resp Temp SpO2   (!) 133/91 96 -- -- 100 %      MAP       --         Physical Exam    Nursing note and vitals reviewed.  Constitutional: He appears well-developed and well-nourished. No distress.   HENT:   Head: Normocephalic and atraumatic.   Nose: Nose normal.   Mouth/Throat: Oropharynx is clear and moist and mucous membranes are normal.   Eyes: Conjunctivae and EOM are normal. Pupils are equal, round, and reactive to light.   Neck: Neck supple. No tracheal deviation present.   Cardiovascular:  Normal rate, normal heart sounds, intact distal pulses and normal pulses. An irregular rhythm present.           Pulmonary/Chest: Effort normal and breath sounds normal. No respiratory distress.   Abdominal: Abdomen is soft. There is no abdominal tenderness. There is no rebound and no guarding.   Musculoskeletal:         General: Normal range of motion.      Cervical back: Neck supple.     Neurological: He is alert and oriented to person, place, and time. GCS score is 15.   CN II-XII intact. Moves all extremities. No gross sensory or motor deficits.   Skin: Skin is warm, dry and intact.   Psychiatric: He has a normal mood and affect. His speech is normal and behavior is normal. Judgment and thought content normal. Cognition and memory are normal.       RESULTS:  Labs Reviewed   COMPREHENSIVE METABOLIC PANEL - Abnormal; Notable for the following components:       Result Value    Globulin 3.6 (*)     All other components within normal limits   CBC WITH DIFFERENTIAL - Abnormal; Notable for the following components:    MCH 31.4 (*)     MPV 11.5 (*)     All  other components within normal limits   D DIMER, QUANTITATIVE - Abnormal; Notable for the following components:    D-Dimer 0.93 (*)     All other components within normal limits   TROPONIN I - Normal   B-TYPE NATRIURETIC PEPTIDE - Normal   CBC W/ AUTO DIFFERENTIAL    Narrative:     The following orders were created for panel order CBC auto differential.  Procedure                               Abnormality         Status                     ---------                               -----------         ------                     CBC with Differential[[id number]]        Abnormal            Final result                 Please view results for these tests on the individual orders.     Imaging Results              CTA Chest Non-Coronary (PE Studies) (Preliminary result)  Result time 06/03/23 05:53:45      Preliminary result by Pool Lu MD (06/03/23 05:53:45)                   Narrative:    START OF REPORT:  Technique: CT Scan of the chest was performed with intravenous contrast with direct axial images as well as sagittal and coronal reconstruction images pulmonary embolus protocol.    Dosage Information: Automated Exposure Control was utilized 227.18 mGy.cm.    Comparison: None.    Clinical History: Chest pain.    Findings:  Soft Tissues: Unremarkable.  Axilla: A few mildly prominent lymph nodes are seen in the left and right axilla.  Neck: The visualized soft tissues of the neck appear unremarkable.  Mediastinum: The mediastinal structures are within normal limits.  Heart: The heart appears unremarkable.  Aorta: Unremarkable appearing aorta.  Pulmonary Arteries: No filling defects are seen in the pulmonary arteries to suggest pulmonary embolus.  Lungs: There is moderate non specific dependent change at the lung bases. Mild streaky opacity is seen in the right lower lobe and left lower lobe consistent with scarring and or subsegmental atelectasis. No acute focal infiltrate or consolidation is seen.  Pleura: No  effusions or pneumothorax are identified.  Bony Structures:  Spine: Mild spondylotic changes are seen in the thoracic spine.  Abdomen: Surgical clips are seen in the right upper quadrant suggesting prior cholecystectomy. Multiple calcific densities are seen in the left liver lobe likely calcified granuloma.      Impression:  1. No acute focal infiltrate or consolidation is seen.  2. No CT evidence of pulmonary embolism or other acute intrathoracic pathology. Details and other findings as discussed above.                                         X-Ray Chest AP Portable (Preliminary result)  Result time 06/03/23 04:30:46      Wet Read by Oswald Orr DO (06/03/23 04:30:46, [facility] - Emergency Dept, Emergency Medicine)    Normal cardiomediastinal silhouette.  No dense lobar consolidation or pneumothorax.                                    PROCEDURES:  Procedures    ECG:  EKG Readings: (Independently Interpreted)   Initial Reading: No STEMI. Previous EKG: Compared with most recent EKG Previous EKG Date: 4/10/23. Rhythm: Normal Sinus Rhythm. Heart Rate: 91. Conduction: Normal. Axis: Normal.     ED COURSE AND MEDICAL DECISION MAKING:  Medications   aspirin tablet 325 mg (325 mg Oral Not Given 6/3/23 0415)   iopamidoL (ISOVUE-370) injection 100 mL (100 mLs Intravenous Given 6/3/23 0547)     ED Course as of 06/03/23 0639   Sat Jun 03, 2023   0530 D-Dimer(!): 0.93  We will get CTA chest to evaluate for PE. [IB]   0635 No acute abnormality appreciated; no acute abnormality noted on the CT scan.  Stable for discharge to home.  ER precautions for any acute worsening. [MW]      ED Course User Index  [IB] Oswald Orr DO  [MW] Kemar Reyes MD        Medical Decision Making  48-year-old male who presents with sharp left-sided chest pain that he states has been intermittent over the past month but tonight was much more severe and associated with shortness of breath.  Reports history of DVT in the past for  which he was initially on Coumadin then switch to Xarelto and then just stopped taking it because it was too expensive.  Chest pain workup was initiated.  ECG shows no evidence of STEMI, does not appear significantly changed from prior ECG.  Chest x-ray shows no acute intrathoracic process.  Initial troponin normal.  BNP normal.  CBC and CMP grossly unremarkable.  D-dimer elevated 0.93 so CTA chest was obtained. Care transferred to Dr. Reyes at shift change.    Problems Addressed:  Chest pain: complicated acute illness or injury     Details: Differential Diagnoses: Acute Coronary Syndrome, Aortic Dissection, Pneumonia, Pneumothorax, Pulmonary Embolism, Neoplasm, Esophagitis, Gastroesophageal Reflux, Costochondritis, Musculoskeletal Pain.    Amount and/or Complexity of Data Reviewed  External Data Reviewed: labs, ECG and notes.  Labs: ordered. Decision-making details documented in ED Course.  Radiology: ordered and independent interpretation performed. Decision-making details documented in ED Course.  ECG/medicine tests: ordered and independent interpretation performed. Decision-making details documented in ED Course.        CLINICAL IMPRESSION:  1. Atypical chest pain    2. Chest pain        DISPOSITION:   ED Disposition Condition    Discharge Stable            ED Prescriptions    None       Follow-up Information       Follow up With Specialties Details Why Contact Info    [facility] - Emergency Dept Emergency Medicine  As needed, If symptoms worsen [address] [place] [facility] [address] [phone]    Primary Care Physician  In 5 days                 Kemar Reyes MD  06/03/23 7785

## 2023-06-05 ENCOUNTER — PATIENT OUTREACH (OUTPATIENT)
Dept: EMERGENCY MEDICINE | Facility: HOSPITAL | Age: 48
End: 2023-06-05
Payer: MEDICAID

## 2023-06-05 NOTE — PROGRESS NOTES
Spoke to pt for post ED f/u. Pt reports relief. Advised pt to f/u discharge instructions and to call and notify cardiology to obtain f/u appt. Pt also reports that he has a Holter monitor and was suppose to return it and has not and is having transportation issues at this time due to issues with dmv, stressed importance of returning holter monitor and to call cardiologist to advise of issues with getting holter monitor to office due to transportation. Pt reports he will try and get a ride to bring monitor and pt also reports that he does not wish to go back to his cardiologist and will obtain another cardiologist due to was not please with last visit. Also discussed importance of obtaining a pcp and may need a referral to cardiologist and offered assistance with obtaining pcp and also calling cardiologist office to obtain new pt appt and f/u cardiology appt. Pt does not wish for assistance with getting appts and reports he will discuss with is s/o and call back to discuss. Discussed Heart Healthy Diet compliance, benefits of PCP, ED utilization and to utilize UCC for non emergency issues until establish PCP and when PCP is not available, oral/vision care and to call [facility] for f/u appt and stressed importance of obtaining pcp appt, also stressed importance to pt of being compliant with appts, health care and treatment plan. Pt voices understanding to instructions given and appreciation.

## 2023-06-21 ENCOUNTER — PATIENT OUTREACH (OUTPATIENT)
Dept: EMERGENCY MEDICINE | Facility: HOSPITAL | Age: 48
End: 2023-06-21
Payer: MEDICAID

## 2023-06-22 ENCOUNTER — PATIENT OUTREACH (OUTPATIENT)
Dept: EMERGENCY MEDICINE | Facility: HOSPITAL | Age: 48
End: 2023-06-22
Payer: MEDICAID

## 2023-06-26 ENCOUNTER — PATIENT OUTREACH (OUTPATIENT)
Dept: EMERGENCY MEDICINE | Facility: HOSPITAL | Age: 48
End: 2023-06-26
Payer: MEDICAID

## 2023-06-26 NOTE — PROGRESS NOTES
3rd attempt to reach out to pt for f/u, no answer. resolved/closed encounter, unable to reach x 3 attempts.

## 2025-07-15 ENCOUNTER — HOSPITAL ENCOUNTER (EMERGENCY)
Facility: HOSPITAL | Age: 50
Discharge: LEFT BEFORE DISCHARGE | End: 2025-07-15
Attending: FAMILY MEDICINE

## 2025-07-15 VITALS
TEMPERATURE: 98 F | RESPIRATION RATE: 18 BRPM | SYSTOLIC BLOOD PRESSURE: 160 MMHG | DIASTOLIC BLOOD PRESSURE: 83 MMHG | HEART RATE: 102 BPM | BODY MASS INDEX: 29.65 KG/M2 | WEIGHT: 207.13 LBS | HEIGHT: 70 IN | OXYGEN SATURATION: 97 %

## 2025-07-15 DIAGNOSIS — Z53.21 ELOPED FROM EMERGENCY DEPARTMENT: Primary | ICD-10-CM

## 2025-07-15 PROCEDURE — 99281 EMR DPT VST MAYX REQ PHY/QHP: CPT

## 2025-07-15 RX ORDER — KETOROLAC TROMETHAMINE 10 MG/1
10 TABLET, FILM COATED ORAL
Status: DISCONTINUED | OUTPATIENT
Start: 2025-07-15 | End: 2025-07-15 | Stop reason: HOSPADM

## 2025-07-15 NOTE — ED PROVIDER NOTES
"Encounter Date: 7/15/2025       History     Chief Complaint   Patient presents with    Arm Pain     Reports redness, soreness, warmth, and swelling to L arm that started Saturday. States "there is a knot in the middle." Redness and warmth noted to L arm. Reports taking friends antibiotics at home.      Patient is a 50-year-old male who presents for evaluation of his left bicep.  Reports an area of swelling and tenderness which he noted 3 days ago.  Reports taking antibiotics provided by a friend in effort to treat symptoms without improvement.  He is voicing concerns that he may have been exposed to a "staph infection" from a friend.  Denies trauma to affected area, open wounds to area, purulent drainage from area, chest pain, shortness of breath, fever, or loss of bowel or bladder control.      Review of patient's allergies indicates:  No Known Allergies  History reviewed. No pertinent past medical history.  History reviewed. No pertinent surgical history.  No family history on file.  Social History[1]  Review of Systems   Constitutional:  Negative for chills, diaphoresis, fatigue and fever.   HENT:  Negative for facial swelling, postnasal drip, rhinorrhea, sinus pressure, sinus pain, sore throat and trouble swallowing.    Respiratory:  Negative for cough, chest tightness, shortness of breath and wheezing.    Cardiovascular:  Negative for chest pain, palpitations and leg swelling.   Gastrointestinal:  Negative for abdominal pain, diarrhea, nausea and vomiting.   Genitourinary:  Negative for dysuria, flank pain, hematuria and urgency.   Musculoskeletal:  Negative for arthralgias, back pain and myalgias.   Skin:  Positive for color change. Negative for rash.   Neurological:  Negative for dizziness, syncope, weakness and headaches.   Hematological:  Does not bruise/bleed easily.   All other systems reviewed and are negative.      Physical Exam     Initial Vitals [07/15/25 1711]   BP Pulse Resp Temp SpO2   (!) 160/83 " 102 18 98.4 °F (36.9 °C) 97 %      MAP       --         Physical Exam    Nursing note and vitals reviewed.  Constitutional: Vital signs are normal. He appears well-developed and well-nourished.   HENT:   Head: Normocephalic.   Nose: Nose normal. Mouth/Throat: Oropharynx is clear and moist.   Eyes: Conjunctivae and EOM are normal. Pupils are equal, round, and reactive to light.   Neck: Neck supple.   Normal range of motion.  Cardiovascular:  Normal rate, regular rhythm, normal heart sounds and intact distal pulses.           Pulmonary/Chest: Effort normal and breath sounds normal. No respiratory distress. He has no wheezes. He has no rhonchi. He has no rales. He exhibits no tenderness.   Abdominal: Abdomen is soft and flat. Bowel sounds are normal. There is no abdominal tenderness. There is no rebound, no guarding, no tenderness at McBurney's point and negative Alonzo's sign.   Musculoskeletal:         General: Normal range of motion.        Arms:       Cervical back: Normal range of motion and neck supple.     Neurological: He is alert and oriented to person, place, and time. He has normal strength.   Skin: Skin is warm and dry. Capillary refill takes less than 2 seconds.   Psychiatric: He has a normal mood and affect. His behavior is normal. Judgment and thought content normal.         ED Course   Procedures  Labs Reviewed   CBC W/ AUTO DIFFERENTIAL    Narrative:     The following orders were created for panel order CBC auto differential.  Procedure                               Abnormality         Status                     ---------                               -----------         ------                     CBC with Differential[[id number]]                                                         Please view results for these tests on the individual orders.   COMPREHENSIVE METABOLIC PANEL   CBC WITH DIFFERENTIAL          Imaging Results    None          Medications   ketorolac tablet 10 mg (has no administration  in time range)     Medical Decision Making  Differential:   Cellulitis       Amount and/or Complexity of Data Reviewed  Labs: ordered.    Risk  Prescription drug management.               ED Course as of 07/15/25 1843   Tue Jul 15, 2025   1842 I have been notified by ED nursing staff that patient has eloped from department.  He was noted ambulating from waiting room without assistance. [JA]      ED Course User Index  [JA] Dev Chacon Jr., DNP                           This chart is generated using a voice recognition system. Grammatical and content areas may inadvertently be generated in error. Please contact me if you find a perceive any inappropriate information in this chart. Thank you.       Clinical Impression:  Final diagnoses:  [Z53.21] Eloped from emergency department (Primary)          ED Disposition Condition    Eloped                       [1]   Social History  Tobacco Use    Smoking status: Every Day     Current packs/day: 0.50     Types: Cigarettes    Smokeless tobacco: Never   Substance Use Topics    Alcohol use: Not Currently    Drug use: Never        Dev Chacon Jr., DNP  07/15/25 1843